# Patient Record
Sex: FEMALE | URBAN - METROPOLITAN AREA
[De-identification: names, ages, dates, MRNs, and addresses within clinical notes are randomized per-mention and may not be internally consistent; named-entity substitution may affect disease eponyms.]

---

## 2018-11-19 ENCOUNTER — OFFICE VISIT (OUTPATIENT)
Dept: URBAN - METROPOLITAN AREA CLINIC 23 | Facility: CLINIC | Age: 68
End: 2018-11-19
Payer: COMMERCIAL

## 2018-11-19 DIAGNOSIS — T37.2X5A ADVERSE EFFECT OF ANTIMALARIALS, INITIAL ENCOUNTER: ICD-10-CM

## 2018-11-19 DIAGNOSIS — H43.812 VITREOUS DEGENERATION, LEFT EYE: Primary | ICD-10-CM

## 2018-11-19 DIAGNOSIS — H25.13 AGE-RELATED NUCLEAR CATARACT, BILATERAL: ICD-10-CM

## 2018-11-19 DIAGNOSIS — H04.123 DRY EYE SYNDROME OF BILATERAL LACRIMAL GLANDS: ICD-10-CM

## 2018-11-19 PROCEDURE — 92134 CPTRZ OPH DX IMG PST SGM RTA: CPT | Performed by: OPTOMETRIST

## 2018-11-19 PROCEDURE — 99204 OFFICE O/P NEW MOD 45 MIN: CPT | Performed by: OPTOMETRIST

## 2018-11-19 ASSESSMENT — VISUAL ACUITY
OD: 20/30
OS: 20/20

## 2018-11-19 ASSESSMENT — INTRAOCULAR PRESSURE
OD: 15
OS: 16

## 2018-11-19 ASSESSMENT — KERATOMETRY
OD: 45.13
OS: 45.75

## 2018-11-19 NOTE — IMPRESSION/PLAN
Impression: Age-related nuclear cataract, bilateral: H25.13. Plan: Cataracts account for the patient's complaints. Discussed options, surgery or spectacle change. Explained surgery risks, benefits, procedures and recovery.   Patient does not qualify for surgery yet and elects to change glasses first.

## 2018-11-19 NOTE — IMPRESSION/PLAN
Impression: Adverse effect of antimalarials, initial encounter: T37.2X5A. Plan: Pt to start on Plaquenil. No maculopathy seen. Pt to return for VF once starting on medication.

## 2018-11-19 NOTE — IMPRESSION/PLAN
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 Plan: Discussed diagnosis in detail with patient. No treatment is required at this time. Will continue to observe condition and or symptoms. Patient instructed to call if condition gets worse.

## 2019-03-27 ENCOUNTER — OFFICE VISIT (OUTPATIENT)
Dept: URBAN - METROPOLITAN AREA CLINIC 23 | Facility: CLINIC | Age: 69
End: 2019-03-27
Payer: COMMERCIAL

## 2019-03-27 DIAGNOSIS — H00.015 HORDEOLUM EXTERNUM LEFT LOWER EYELID: Primary | ICD-10-CM

## 2019-03-27 PROCEDURE — 92012 INTRM OPH EXAM EST PATIENT: CPT | Performed by: OPTOMETRIST

## 2019-03-27 ASSESSMENT — INTRAOCULAR PRESSURE
OS: 16
OD: 16

## 2019-03-27 NOTE — IMPRESSION/PLAN
Impression: Hordeolum externum left lower eyelid: H00.015. Plan: Discussed diagnosis in detail with patient. Reassured patient of current condition and treatment. Will continue to observe condition and or symptoms. Patient instructed to apply warm compresses.

## 2022-10-27 ENCOUNTER — APPOINTMENT (OUTPATIENT)
Dept: URBAN - METROPOLITAN AREA CLINIC 291 | Age: 72
Setting detail: DERMATOLOGY
End: 2022-10-28

## 2022-10-27 DIAGNOSIS — L90.5 SCAR CONDITIONS AND FIBROSIS OF SKIN: ICD-10-CM

## 2022-10-27 DIAGNOSIS — M06.9 RHEUMATOID ARTHRITIS, UNSPECIFIED: ICD-10-CM

## 2022-10-27 DIAGNOSIS — L82.0 INFLAMED SEBORRHEIC KERATOSIS: ICD-10-CM

## 2022-10-27 DIAGNOSIS — D485 NEOPLASM OF UNCERTAIN BEHAVIOR OF SKIN: ICD-10-CM

## 2022-10-27 DIAGNOSIS — D18.0 HEMANGIOMA: ICD-10-CM

## 2022-10-27 DIAGNOSIS — L82.1 OTHER SEBORRHEIC KERATOSIS: ICD-10-CM

## 2022-10-27 DIAGNOSIS — Z85.828 PERSONAL HISTORY OF OTHER MALIGNANT NEOPLASM OF SKIN: ICD-10-CM

## 2022-10-27 DIAGNOSIS — L57.8 OTHER SKIN CHANGES DUE TO CHRONIC EXPOSURE TO NONIONIZING RADIATION: ICD-10-CM

## 2022-10-27 DIAGNOSIS — D22 MELANOCYTIC NEVI: ICD-10-CM

## 2022-10-27 PROBLEM — D48.5 NEOPLASM OF UNCERTAIN BEHAVIOR OF SKIN: Status: ACTIVE | Noted: 2022-10-27

## 2022-10-27 PROBLEM — D22.5 MELANOCYTIC NEVI OF TRUNK: Status: ACTIVE | Noted: 2022-10-27

## 2022-10-27 PROBLEM — D18.01 HEMANGIOMA OF SKIN AND SUBCUTANEOUS TISSUE: Status: ACTIVE | Noted: 2022-10-27

## 2022-10-27 PROCEDURE — OTHER COUNSELING: OTHER

## 2022-10-27 PROCEDURE — 99203 OFFICE O/P NEW LOW 30 MIN: CPT | Mod: 25

## 2022-10-27 PROCEDURE — 11102 TANGNTL BX SKIN SINGLE LES: CPT | Mod: 59

## 2022-10-27 PROCEDURE — OTHER OTHER: OTHER

## 2022-10-27 PROCEDURE — OTHER BIOPSY BY SHAVE METHOD: OTHER

## 2022-10-27 PROCEDURE — 17110 DESTRUCT B9 LESION 1-14: CPT

## 2022-10-27 PROCEDURE — 11103 TANGNTL BX SKIN EA SEP/ADDL: CPT | Mod: 59

## 2022-10-27 PROCEDURE — OTHER LIQUID NITROGEN: OTHER

## 2022-10-27 PROCEDURE — OTHER MIPS QUALITY: OTHER

## 2022-10-27 ASSESSMENT — LOCATION SIMPLE DESCRIPTION DERM
LOCATION SIMPLE: RIGHT POSTERIOR THIGH
LOCATION SIMPLE: RIGHT UPPER BACK
LOCATION SIMPLE: CHEST
LOCATION SIMPLE: LEFT POSTERIOR THIGH
LOCATION SIMPLE: RIGHT NOSE
LOCATION SIMPLE: LEFT FIRST MTP
LOCATION SIMPLE: ABDOMEN
LOCATION SIMPLE: LEFT UPPER BACK
LOCATION SIMPLE: LEFT CHEEK
LOCATION SIMPLE: UPPER BACK
LOCATION SIMPLE: RIGHT FOREARM
LOCATION SIMPLE: LEFT LOWER BACK

## 2022-10-27 ASSESSMENT — LOCATION DETAILED DESCRIPTION DERM
LOCATION DETAILED: LEFT INFERIOR LATERAL MALAR CHEEK
LOCATION DETAILED: LEFT FIRST METATARSOPHALANGEAL JOINT
LOCATION DETAILED: PERIUMBILICAL SKIN
LOCATION DETAILED: LEFT INFERIOR LATERAL MIDBACK
LOCATION DETAILED: LEFT MEDIAL SUPERIOR CHEST
LOCATION DETAILED: LEFT PROXIMAL POSTERIOR THIGH
LOCATION DETAILED: SUPERIOR THORACIC SPINE
LOCATION DETAILED: RIGHT INFERIOR MEDIAL UPPER BACK
LOCATION DETAILED: RIGHT RIB CAGE
LOCATION DETAILED: LEFT CENTRAL MALAR CHEEK
LOCATION DETAILED: RIGHT DISTAL DORSAL FOREARM
LOCATION DETAILED: RIGHT PROXIMAL DORSAL FOREARM
LOCATION DETAILED: RIGHT DISTAL POSTERIOR THIGH
LOCATION DETAILED: RIGHT NASAL SIDEWALL
LOCATION DETAILED: LEFT MEDIAL MALAR CHEEK
LOCATION DETAILED: LEFT INFERIOR UPPER BACK

## 2022-10-27 ASSESSMENT — LOCATION ZONE DERM
LOCATION ZONE: FACE
LOCATION ZONE: TRUNK
LOCATION ZONE: NOSE
LOCATION ZONE: ARM
LOCATION ZONE: TOE
LOCATION ZONE: LEG

## 2022-10-27 NOTE — PROCEDURE: OTHER
Detail Level: Zone
Render Risk Assessment In Note?: no
Other (Free Text): Patient states she got the scar from a dog bite
Note Text (......Xxx Chief Complaint.): This diagnosis correlates with the

## 2022-10-27 NOTE — PROCEDURE: MIPS QUALITY
Detail Level: Detailed
Quality 431: Preventive Care And Screening: Unhealthy Alcohol Use - Screening: Patient not identified as an unhealthy alcohol user when screened for unhealthy alcohol use using a systematic screening method
Quality 110: Preventive Care And Screening: Influenza Immunization: Influenza Immunization Administered during Influenza season
Quality 111:Pneumonia Vaccination Status For Older Adults: Pneumococcal vaccine (PPSV23) administered on or after patient’s 60th birthday and before the end of the measurement period
Quality 226: Preventive Care And Screening: Tobacco Use: Screening And Cessation Intervention: Patient screened for tobacco use and is an ex/non-smoker

## 2022-10-27 NOTE — PROCEDURE: LIQUID NITROGEN
Detail Level: Detailed
Add 52 Modifier (Optional): no
Show Spray Paint Technique Variable?: Yes
Application Tool (Optional): Liquid Nitrogen Sprayer
Post-Care Instructions: I reviewed with the patient in detail post-care instructions. Patient is to wear sunprotection, and avoid picking at any of the treated lesions. Pt may apply Vaseline to crusted or scabbing areas.
Spray Paint Text: The liquid nitrogen was applied to the skin utilizing a spray paint frosting technique.
Duration Of Freeze Thaw-Cycle (Seconds): 3
Medical Necessity Clause: This procedure was medically necessary because the lesions that were treated were:
Consent: The patient's consent was obtained including but not limited to risks of crusting, scabbing, blistering, scarring, darker or lighter pigmentary change, recurrence, incomplete removal and infection.
Medical Necessity Information: It is in your best interest to select a reason for this procedure from the list below. All of these items fulfill various CMS LCD requirements except the new and changing color options.
Number Of Freeze-Thaw Cycles: 1 freeze-thaw cycle

## 2022-10-27 NOTE — PROCEDURE: BIOPSY BY SHAVE METHOD
Dressing: bandage
Bill For Surgical Tray: no
Notification Instructions: Patient will be notified of biopsy results. However, patient instructed to call the office if not contacted within 2 weeks.
Cryotherapy Text: The wound bed was treated with cryotherapy after the biopsy was performed.
Detail Level: Detailed
Was A Bandage Applied: Yes
Size Of Lesion In Cm: 0.3
Consent: Written consent was obtained and risks were reviewed including but not limited to scarring, infection, bleeding, scabbing, incomplete removal, nerve damage and allergy to anesthesia.
Billing Type: Third-Party Bill
Biopsy Method: Dermablade
Anesthesia Type: 1% lidocaine with epinephrine
Curettage Text: The wound bed was treated with curettage after the biopsy was performed.
Wound Care: Petrolatum
Type Of Destruction Used: Curettage
Silver Nitrate Text: The wound bed was treated with silver nitrate after the biopsy was performed.
X Size Of Lesion In Cm: 0
Biopsy Type: H and E
Electrodesiccation Text: The wound bed was treated with electrodesiccation after the biopsy was performed.
Information: Selecting Yes will display possible errors in your note based on the variables you have selected. This validation is only offered as a suggestion for you. PLEASE NOTE THAT THE VALIDATION TEXT WILL BE REMOVED WHEN YOU FINALIZE YOUR NOTE. IF YOU WANT TO FAX A PRELIMINARY NOTE YOU WILL NEED TO TOGGLE THIS TO 'NO' IF YOU DO NOT WANT IT IN YOUR FAXED NOTE.
Electrodesiccation And Curettage Text: The wound bed was treated with electrodesiccation and curettage after the biopsy was performed.
Post-Care Instructions: I reviewed with the patient in detail post-care instructions. Patient is to keep the biopsy site dry overnight, and then apply bacitracin twice daily until healed. Patient may apply hydrogen peroxide soaks to remove any crusting.
Depth Of Biopsy: dermis
Anesthesia Volume In Cc (Will Not Render If 0): 0.5
Hemostasis: Drysol
Size Of Lesion In Cm: 0.4

## 2022-12-01 ENCOUNTER — APPOINTMENT (OUTPATIENT)
Dept: URBAN - METROPOLITAN AREA CLINIC 224 | Age: 72
Setting detail: DERMATOLOGY
End: 2022-12-02

## 2022-12-01 DIAGNOSIS — D49.2 NEOPLASM OF UNSPECIFIED BEHAVIOR OF BONE, SOFT TISSUE, AND SKIN: ICD-10-CM

## 2022-12-01 PROBLEM — C44.319 BASAL CELL CARCINOMA OF SKIN OF OTHER PARTS OF FACE: Status: ACTIVE | Noted: 2022-12-01

## 2022-12-01 PROBLEM — C44.329 SQUAMOUS CELL CARCINOMA OF SKIN OF OTHER PARTS OF FACE: Status: ACTIVE | Noted: 2022-12-01

## 2022-12-01 PROCEDURE — 99214 OFFICE O/P EST MOD 30 MIN: CPT | Mod: 57

## 2022-12-01 PROCEDURE — 14040 TIS TRNFR F/C/C/M/N/A/G/H/F: CPT

## 2022-12-01 PROCEDURE — 12052 INTMD RPR FACE/MM 2.6-5.0 CM: CPT | Mod: 59

## 2022-12-01 PROCEDURE — OTHER MOHS SURGERY: OTHER

## 2022-12-01 PROCEDURE — 17312 MOHS ADDL STAGE: CPT

## 2022-12-01 PROCEDURE — OTHER CONSULTATION FOR MOHS SURGERY: OTHER

## 2022-12-01 PROCEDURE — OTHER PRESCRIPTION: OTHER

## 2022-12-01 PROCEDURE — 17311 MOHS 1 STAGE H/N/HF/G: CPT

## 2022-12-01 PROCEDURE — OTHER COUNSELING: OTHER

## 2022-12-01 PROCEDURE — 17311 MOHS 1 STAGE H/N/HF/G: CPT | Mod: 76

## 2022-12-01 PROCEDURE — OTHER DECISION TO PERFORM MAJOR SURGERY: OTHER

## 2022-12-01 ASSESSMENT — LOCATION SIMPLE DESCRIPTION DERM: LOCATION SIMPLE: LEFT CHEEK

## 2022-12-01 ASSESSMENT — LOCATION DETAILED DESCRIPTION DERM: LOCATION DETAILED: LEFT MEDIAL MALAR CHEEK

## 2022-12-01 ASSESSMENT — LOCATION ZONE DERM: LOCATION ZONE: FACE

## 2022-12-01 NOTE — PROCEDURE: MOHS SURGERY
no transient paralysis/no weakness/no paresthesias/no generalized seizures/no vertigo/no loss of sensation/no difficulty walking Paramedian Forehead Flap Text: A decision was made to reconstruct the defect utilizing an interpolation axial flap and a staged reconstruction.  A telfa template was made of the defect.  This telfa template was then used to outline the paramedian forehead pedicle flap.  The donor area for the pedicle flap was then injected with anesthesia.  The flap was excised through the skin and subcutaneous tissue down to the layer of the underlying musculature.  The pedicle flap was carefully excised within this deep plane to maintain its blood supply.  The edges of the donor site were undermined.   The donor site was closed in a primary fashion.  The pedicle was then rotated into position and sutured.  Once the tube was sutured into place, adequate blood supply was confirmed with blanching and refill.  The pedicle was then wrapped with xeroform gauze and dressed appropriately with a telfa and gauze bandage to ensure continued blood supply and protect the attached pedicle.

## 2022-12-01 NOTE — PROCEDURE: CONSULTATION FOR MOHS SURGERY
X Size Of Lesion In Cm (Optional): 0.7
Detail Level: Detailed
Size Of Lesion: 1
Name Of The Referring Provider For Procedure: Aaron West, PA
X Size Of Lesion In Cm (Optional): 0.8
Incorporate Mauc In Note: Yes
Name Of The Referring Provider For Procedure: Aaron West, PA

## 2022-12-02 RX ORDER — SULFAMETHOXAZOLE AND TRIMETHOPRIM 800; 160 MG/1; MG/1
TABLET ORAL
Qty: 14 | Refills: 0 | Status: ERX | COMMUNITY
Start: 2022-12-01

## 2022-12-02 RX ORDER — MUPIROCIN 20 MG/G
OINTMENT TOPICAL
Qty: 22 | Refills: 0 | Status: ERX | COMMUNITY
Start: 2022-12-01

## 2022-12-13 ENCOUNTER — APPOINTMENT (OUTPATIENT)
Dept: URBAN - METROPOLITAN AREA CLINIC 224 | Age: 72
Setting detail: DERMATOLOGY
End: 2022-12-23

## 2022-12-13 DIAGNOSIS — Z48.02 ENCOUNTER FOR REMOVAL OF SUTURES: ICD-10-CM

## 2022-12-13 PROCEDURE — OTHER COUNSELING: OTHER

## 2022-12-13 PROCEDURE — OTHER SUTURE REMOVAL (GLOBAL PERIOD): OTHER

## 2022-12-13 PROCEDURE — 99024 POSTOP FOLLOW-UP VISIT: CPT

## 2022-12-13 PROCEDURE — OTHER PRESCRIPTION: OTHER

## 2022-12-13 RX ORDER — ALOE/LA/CERAMID/SILICONES/TAPE 1%-1.6X4.8
KIT TOPICAL
Qty: 2 | Refills: 0 | Status: ACTIVE

## 2022-12-13 ASSESSMENT — LOCATION SIMPLE DESCRIPTION DERM: LOCATION SIMPLE: LEFT CHEEK

## 2022-12-13 ASSESSMENT — LOCATION ZONE DERM: LOCATION ZONE: FACE

## 2022-12-13 ASSESSMENT — LOCATION DETAILED DESCRIPTION DERM
LOCATION DETAILED: LEFT CENTRAL MALAR CHEEK
LOCATION DETAILED: LEFT MEDIAL MALAR CHEEK

## 2022-12-13 NOTE — PROCEDURE: SUTURE REMOVAL (GLOBAL PERIOD)
Detail Level: Detailed
Add 50427 Cpt? (Important Note: In 2017 The Use Of 61551 Is Being Tracked By Cms To Determine Future Global Period Reimbursement For Global Periods): yes

## 2022-12-14 RX ORDER — ALOE/LA/CERAMID/SILICONES/TAPE 1%-1.6X4.8
KIT TOPICAL
Qty: 1 | Refills: 0 | Status: ACTIVE

## 2022-12-30 ENCOUNTER — APPOINTMENT (OUTPATIENT)
Dept: URBAN - METROPOLITAN AREA CLINIC 224 | Age: 72
Setting detail: DERMATOLOGY
End: 2022-12-31

## 2022-12-30 PROBLEM — C44.519 BASAL CELL CARCINOMA OF SKIN OF OTHER PART OF TRUNK: Status: ACTIVE | Noted: 2022-12-30

## 2022-12-30 PROCEDURE — 17262 DSTRJ MAL LES T/A/L 1.1-2.0: CPT | Mod: 79

## 2022-12-30 PROCEDURE — OTHER COUNSELING: OTHER

## 2022-12-30 PROCEDURE — OTHER CURETTAGE AND DESTRUCTION: OTHER

## 2022-12-30 NOTE — PROCEDURE: CURETTAGE AND DESTRUCTION
Bill For Surgical Tray: no
Anesthesia Type: 1% lidocaine with epinephrine
Detail Level: Detailed
Total Volume (Ccs): 1
Final Size Statement: The size of the lesion after curettage was
Additional Information: (Optional): The wound was cleaned, and a pressure dressing was applied.  The patient received detailed post-op instructions.
Size Of Lesion In Cm: 0.8
Concentration (Mg/Ml Or Millions Of Plaque Forming Units/Cc): 0.01
What Was Performed First?: Curettage
Biopsy Photograph Reviewed: Yes
Cautery Type: electrodesiccation
Consent was obtained from the patient. The risks, benefits and alternatives to therapy were discussed in detail. Specifically, the risks of infection, scarring, bleeding, prolonged wound healing, nerve injury, incomplete removal, allergy to anesthesia and recurrence were addressed. Alternatives to ED&C, such as: surgical removal and XRT were also discussed.  Prior to the procedure, the treatment site was clearly identified and confirmed by the patient. All components of Universal Protocol/PAUSE Rule completed.
Post-Care Instructions: I reviewed with the patient in detail post-care instructions. Patient is to keep the area dry for 48 hours, and not to engage in any swimming until the area is healed. Should the patient develop any fevers, chills, bleeding, severe pain patient will contact the office immediately.
Number Of Curettages: 4
Bill As A Line Item Or As Units: Line Item
Anesthesia Volume In Cc: 6
Size Of Lesion After Curettage: 1.3

## 2023-01-10 ENCOUNTER — APPOINTMENT (OUTPATIENT)
Dept: URBAN - METROPOLITAN AREA CLINIC 224 | Age: 73
Setting detail: DERMATOLOGY
End: 2023-01-11

## 2023-01-10 DIAGNOSIS — Z48.817 ENCOUNTER FOR SURGICAL AFTERCARE FOLLOWING SURGERY ON THE SKIN AND SUBCUTANEOUS TISSUE: ICD-10-CM

## 2023-01-10 PROCEDURE — OTHER POST-OP WOUND CHECK: OTHER

## 2023-01-10 PROCEDURE — 99024 POSTOP FOLLOW-UP VISIT: CPT

## 2023-01-10 PROCEDURE — OTHER COUNSELING: OTHER

## 2023-01-10 PROCEDURE — OTHER PRESCRIPTION: OTHER

## 2023-01-10 RX ORDER — DOXYCYCLINE HYCLATE 100 MG/1
TABLET, COATED ORAL
Qty: 14 | Refills: 0 | Status: ERX | COMMUNITY
Start: 2023-01-10

## 2023-01-10 ASSESSMENT — LOCATION SIMPLE DESCRIPTION DERM: LOCATION SIMPLE: LEFT CHEEK

## 2023-01-10 ASSESSMENT — LOCATION ZONE DERM: LOCATION ZONE: FACE

## 2023-01-10 ASSESSMENT — LOCATION DETAILED DESCRIPTION DERM: LOCATION DETAILED: LEFT MEDIAL MALAR CHEEK

## 2023-01-10 NOTE — PROCEDURE: POST-OP WOUND CHECK
Add 42843 Cpt? (Important Note: In 2017 The Use Of 92177 Is Being Tracked By Cms To Determine Future Global Period Reimbursement For Global Periods): yes
Detail Level: Detailed
Wound Evaluated By: Dr. Bryce Licona

## 2023-01-24 ENCOUNTER — APPOINTMENT (OUTPATIENT)
Dept: URBAN - METROPOLITAN AREA CLINIC 224 | Age: 73
Setting detail: DERMATOLOGY
End: 2023-01-25

## 2023-01-24 DIAGNOSIS — L01.01 NON-BULLOUS IMPETIGO: ICD-10-CM

## 2023-01-24 PROCEDURE — OTHER PRESCRIPTION MEDICATION MANAGEMENT: OTHER

## 2023-01-24 PROCEDURE — OTHER PRESCRIPTION: OTHER

## 2023-01-24 PROCEDURE — 99213 OFFICE O/P EST LOW 20 MIN: CPT | Mod: 24

## 2023-01-24 PROCEDURE — OTHER COUNSELING: OTHER

## 2023-01-24 RX ORDER — CLINDAMYCIN PHOSPHATE 10 MG/ML
LOTION TOPICAL
Qty: 60 | Refills: 0 | Status: ERX | COMMUNITY
Start: 2023-01-24

## 2023-01-24 ASSESSMENT — LOCATION SIMPLE DESCRIPTION DERM: LOCATION SIMPLE: LEFT CHEEK

## 2023-01-24 ASSESSMENT — LOCATION ZONE DERM: LOCATION ZONE: FACE

## 2023-01-24 ASSESSMENT — LOCATION DETAILED DESCRIPTION DERM: LOCATION DETAILED: LEFT INFERIOR CENTRAL MALAR CHEEK

## 2023-01-24 NOTE — PROCEDURE: PRESCRIPTION MEDICATION MANAGEMENT
Continue Regimen: Silicone scar gel sheets
Detail Level: Zone
Render In Strict Bullet Format?: No
Initiate Treatment: clindamycin 1 % lotion: Apply twice daily to affected area

## 2023-03-07 ENCOUNTER — APPOINTMENT (OUTPATIENT)
Dept: URBAN - METROPOLITAN AREA CLINIC 224 | Age: 73
Setting detail: DERMATOLOGY
End: 2023-03-08

## 2023-03-07 DIAGNOSIS — L91.0 HYPERTROPHIC SCAR: ICD-10-CM

## 2023-03-07 DIAGNOSIS — L01.01 NON-BULLOUS IMPETIGO: ICD-10-CM

## 2023-03-07 PROCEDURE — 11900 INJECT SKIN LESIONS </W 7: CPT

## 2023-03-07 PROCEDURE — 99212 OFFICE O/P EST SF 10 MIN: CPT | Mod: 25

## 2023-03-07 PROCEDURE — OTHER INTRALESIONAL KENALOG: OTHER

## 2023-03-07 PROCEDURE — OTHER COUNSELING: OTHER

## 2023-03-07 ASSESSMENT — LOCATION SIMPLE DESCRIPTION DERM: LOCATION SIMPLE: LEFT CHEEK

## 2023-03-07 ASSESSMENT — LOCATION DETAILED DESCRIPTION DERM: LOCATION DETAILED: LEFT INFERIOR CENTRAL MALAR CHEEK

## 2023-03-07 ASSESSMENT — LOCATION ZONE DERM: LOCATION ZONE: FACE
